# Patient Record
Sex: FEMALE | Race: OTHER | NOT HISPANIC OR LATINO
[De-identification: names, ages, dates, MRNs, and addresses within clinical notes are randomized per-mention and may not be internally consistent; named-entity substitution may affect disease eponyms.]

---

## 2022-08-10 ENCOUNTER — APPOINTMENT (OUTPATIENT)
Dept: ORTHOPEDIC SURGERY | Facility: CLINIC | Age: 22
End: 2022-08-10

## 2022-08-10 VITALS — WEIGHT: 106 LBS | HEIGHT: 60 IN | BODY MASS INDEX: 20.81 KG/M2

## 2022-08-10 DIAGNOSIS — M25.511 PAIN IN RIGHT SHOULDER: ICD-10-CM

## 2022-08-10 DIAGNOSIS — S43.431A SUPERIOR GLENOID LABRUM LESION OF RIGHT SHOULDER, INITIAL ENCOUNTER: ICD-10-CM

## 2022-08-10 DIAGNOSIS — M54.2 CERVICALGIA: ICD-10-CM

## 2022-08-10 PROCEDURE — 73030 X-RAY EXAM OF SHOULDER: CPT | Mod: RT

## 2022-08-10 PROCEDURE — 72040 X-RAY EXAM NECK SPINE 2-3 VW: CPT

## 2022-08-10 PROCEDURE — 99204 OFFICE O/P NEW MOD 45 MIN: CPT

## 2022-08-10 RX ORDER — MELOXICAM 15 MG/1
15 TABLET ORAL
Qty: 30 | Refills: 1 | Status: ACTIVE | COMMUNITY
Start: 2022-08-10 | End: 1900-01-01

## 2022-08-10 RX ORDER — CYCLOBENZAPRINE HYDROCHLORIDE 10 MG/1
10 TABLET, FILM COATED ORAL
Qty: 30 | Refills: 1 | Status: ACTIVE | COMMUNITY
Start: 2022-08-10 | End: 1900-01-01

## 2022-08-14 PROBLEM — M25.511 ACUTE PAIN OF RIGHT SHOULDER: Status: ACTIVE | Noted: 2022-08-10

## 2022-08-14 PROBLEM — S43.431A SUPERIOR GLENOID LABRUM LESION OF RIGHT SHOULDER, INITIAL ENCOUNTER: Status: ACTIVE | Noted: 2022-08-14

## 2022-08-14 NOTE — DISCUSSION/SUMMARY
[Medication Risks Reviewed] : Medication risks reviewed [Surgical risks reviewed] : Surgical risks reviewed [de-identified] : signs and symptoms of rotator cuff tear, discussed risks of potential rotator cuff  surgery and risks of operative  and non operative treatment of of tendonitis and impingement, will obtain mri to see if surgery is necessary and guide future treatment, in interim discussed use of nsaids and side effects and recommended rehab and discussed risks and benefits of injection\par prescribed nsaids and discussed risks of side effects and timing and management of medication.  side effects can include gi ulcers and irritation as welll as kidney failure and bleeding issues\par \par \par appears all the pain could be from trapezius spasms she possibly has an underlying shoulder issue but the majoiry is in the neck. recommend therapy , nsadis, and muscle relaxers. \par recommend mri to rule out rtc tear and further guide treatment, follow up immediately after mri of RIGHT SHOULDER \par later on will get MRI of the cervical spine. \par had been doing therapy for last 3 months at gym with  with no improvement

## 2022-08-14 NOTE — HISTORY OF PRESENT ILLNESS
[de-identified] : patient is an avid runner and she claims it bothers her often when shes running and she hears clicking often and it is now painful without activity. patient notes pain began about 4 months ago but didn’t have a specific injury. patient also notes some pain in in thoracic spine and her neck. patient notes she even feels pain walking but the worst is during running. she notes cramping as well.

## 2022-08-14 NOTE — PHYSICAL EXAM
[NL (45)] : right lateral flexion 45 degrees [NL (80)] : right lateral rotation 80 degrees [5___] : right grasp 5[unfilled]/5 [Biceps 2+] : biceps 2+ [Triceps 2+] : triceps 2+ [Brachioradialis 2+] : brachioradialis 2+ [Straightening consistent with spasm] : Straightening consistent with spasm [NL (0-180)] : full active abduction 0-180 degrees [NL (0-90)] : full external rotation 0-90 degrees [NL (0-70)] : full internal rotation 0-70 degrees [] : motor and sensory intact distally [Right] : right shoulder [There are no fractures, subluxations or dislocations. No significant abnormalities are seen] : There are no fractures, subluxations or dislocations. No significant abnormalities are seen

## 2022-09-14 ENCOUNTER — APPOINTMENT (OUTPATIENT)
Dept: PEDIATRIC CARDIOLOGY | Facility: CLINIC | Age: 22
End: 2022-09-14

## 2022-09-14 VITALS
BODY MASS INDEX: 20.73 KG/M2 | HEART RATE: 60 BPM | HEIGHT: 60.83 IN | WEIGHT: 109.79 LBS | SYSTOLIC BLOOD PRESSURE: 124 MMHG | OXYGEN SATURATION: 99 % | DIASTOLIC BLOOD PRESSURE: 72 MMHG

## 2022-09-14 DIAGNOSIS — Q21.1 ATRIAL SEPTAL DEFECT: ICD-10-CM

## 2022-09-14 DIAGNOSIS — Q44.7 OTHER CONGENITAL MALFORMATIONS OF LIVER: ICD-10-CM

## 2022-09-14 DIAGNOSIS — Q25.6 STENOSIS OF PULMONARY ARTERY: ICD-10-CM

## 2022-09-14 PROCEDURE — 99205 OFFICE O/P NEW HI 60 MIN: CPT | Mod: 25

## 2022-09-14 PROCEDURE — 93325 DOPPLER ECHO COLOR FLOW MAPG: CPT

## 2022-09-14 PROCEDURE — 93320 DOPPLER ECHO COMPLETE: CPT

## 2022-09-14 PROCEDURE — 93000 ELECTROCARDIOGRAM COMPLETE: CPT

## 2022-09-14 PROCEDURE — 93303 ECHO TRANSTHORACIC: CPT

## 2022-09-14 RX ORDER — NORETHINDRONE ACETATE AND ETHINYL ESTRADIOL 1.5; 3 MG/1; UG/1
1.5-3 TABLET ORAL
Refills: 0 | Status: ACTIVE | COMMUNITY

## 2022-09-14 RX ORDER — FLUOXETINE HYDROCHLORIDE 40 MG/1
CAPSULE ORAL
Refills: 0 | Status: ACTIVE | COMMUNITY

## 2022-09-14 RX ORDER — DEXMETHYLPHENIDATE HYDROCHLORIDE 10 MG/1
TABLET ORAL
Refills: 0 | Status: ACTIVE | COMMUNITY

## 2022-09-15 PROBLEM — Q21.1 PFO (PATENT FORAMEN OVALE): Status: ACTIVE | Noted: 2022-09-13

## 2022-09-15 PROBLEM — Q44.7 ALAGILLE SYNDROME: Status: ACTIVE | Noted: 2022-09-13

## 2022-09-15 PROBLEM — Q25.6 CONGENITAL STENOSIS OF LEFT PULMONARY ARTERY: Status: ACTIVE | Noted: 2022-09-13

## 2022-09-21 ENCOUNTER — APPOINTMENT (OUTPATIENT)
Dept: ORTHOPEDIC SURGERY | Facility: CLINIC | Age: 22
End: 2022-09-21

## 2022-09-29 NOTE — HISTORY OF PRESENT ILLNESS
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 She remains active without any exercise limitations or symptoms but has recently been evaluated for shoulder pain. Annual liver blood tests are reportedly "good".  Talisha is treated with Dexmethylphenidate and Fluoxetine.

## 2022-09-29 NOTE — CARDIOLOGY SUMMARY
[Today's Date] : [unfilled] [FreeTextEntry1] : EKG showed normal sinus arrhythmia (60/min), normal intervals (, QRS 74, QTc 386 ms) and axes, and normal precordial pattern. Prominent baseline artifact. See scanned tracing. [FreeTextEntry2] : Summary: (see scanned full report)\par 1.  Alagille syndrome with history of mild-moderate, proximal, left pulmonary artery stenosis and \par left-to- right shunt via a trivially patent foramen ovale.\par 2.  Qualitatively, color mapping is consistent with previously documented mild-to-moderate, \par proximal left pulmonary artery stenosis.\par - Measurement of the proximal LPA diameter may be inaccurate.\par - The right pulmonary artery appears mildly small but measured diameter is still within normal \par limits.\par 3.  Previously documented, trivial left-to-right flow jet at foramen ovale level is still present, \par based on technically limited subcostal and parasternal interrogation.\par 4.  Normal right ventricular morphology with qualitatively normal size and systolic function.\par 5. Normal left ventricular size, morphology and systolic function.\par 6. No evidence of pulmonary hypertension.\par 7.  Pulmonary artery pressure estimate is based on interventricular septal systolic configuration.\par 8.  Technically limited examination, especially of pulmonary arteries, pulmonary veins and coronary\par arteries, secondary to poor acoustic windows.\par \par M-mode                       Z-score (where applicable)\par IVSd:                 0.60cm    -2.06*\par IVSs:                 1.29cm    0.61\par LVIDd:                4.60cm    -0.05\par LVIDs:                2.65cm    -1.06\par LVPWd:               0.63cm    -1.66\par LVPWs:               1.49cm    0.84\par \par 2-Dimensional                       Z-score (where applicable)\par LV volume, d (AL)            130mL     1.24\par LV volume, s (AL)            47mL\par LV mass (SAX AL):       87g      -0.71\par LV mass index:            27.12g/ht^2.7\par LA volume:               33.5mL     0.23\par LA volume index:           23.0mL/m2\par Ao annulus:              1.79cm     -0.59\par Ao root sinus, s:            2.49cm     -0.15\par Ao ST junct, s:             2.06cm     -0.08\par Ao arch (transv):       1.69cm     -0.50\par Ao isthmus, s:             1.23cm     -1.18\par RPA, s:                 1.08cm     -1.41\par LPA, s:                 0.67cm     -3.09*\par MV annulus, d (4Ch):         2.59cm     0.05\par TV annulus, d (4Ch):          2.42cm     -0.87\par TAPSE:                2.07cm\par \par Systolic Function                   Z-score (where applicable)\par LV SF (M-mode):           42%\par LV EF (5/6 AL)            64%     0.15\par \par LV Diastolic Function\par Lateral annulus e':         0.16m/s\par E/e' (mitral lateral):         4.63\par Septal annulus e':         0.09m/s\par E/e' (mitral septal):         7.98\par \par Mitral Valve Doppler\par Peak E:               0.76m/s\par \par Pulmonary Arteries Doppler\par LPA peak velocity:         2.33m/s\par LPA peak gradient:        21.80mmHg

## 2022-09-29 NOTE — REASON FOR VISIT
[Initial Consultation] : an initial consultation for [Patient] : patient [Mother] : mother [FreeTextEntry3] : left pulmonary artery stenosis

## 2022-09-29 NOTE — DISCUSSION/SUMMARY
[FreeTextEntry1] : ASSESSMENT:\par 1)	Alagille syndrome with normal liver function and negative vascular malformation screening with brain MRA in 2016.\par 2)	Mild-to-moderate, left, peripheral pulmonary stenosis – stable.\par 3)	Trivially "incompetent" foramen ovale with physiologically insignificant left-to-right flow.\par 4)	S/p spontaneous, (late) closure of a small ductus arteriosus.\par \par DISCUSSION:\par Talisha has mild-to-moderate left peripheral pulmonic stenosis as well as a trivial left to right shunt via a trivially incompetent foramen ovale type of secundum atrial septal defect. The atrial shunt is physiologically insignificant. The peripheral pulmonic stenosis has been relatively stable despite somatic growth. Talisha appears to have near-normal right ventricular pressure (based on end-systolic septal position) with normal systolic function. Pulmonary flow distribution at the last MRI (16) was only mildly asymmetric with QpR/QpL of 56%/44% by branch pulmonary artery flow and 61%/39% by pulmonary vein flow (compared to 62%/38% in 2012). Given the stability of peripheral pulmonic stenosis and excellent right ventricular function until now, we have pursued conservative follow up rather than intervening with balloon/stent pulmonary angioplasty. Nonetheless, we annually reassess the degree of obstruction to ascertain whether the degree of obstruction merits intervention.  More complete reevaluation of the degree of peripheral pulmonary stenosis requires cardiac MRI.  The cardiac MRI results inform the decision whether to consider intervening with balloon pulmonary angioplasty +/- stenting.\par \par Because Alagille syndrome can be associated with progressive liver involvement, Talisha should continue to have serial reevaluation of liver enzymes and pancreatic function as part of her routine annual checkups.\par \par We discussed the genetic implications of Alagille syndrome diagnosis. Our knowledge of genetics is evolving rapidly and a formal consultation with the cardiovascular genetics program may be beneficial in understanding the implications better. 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  Although it can be mild (as in Talisha and her sister's cases) but it can also be associated with severe congenital heart disease, usually tetralogy of Fallot or other defects associated with pulmonary arterial underdevelopment. Other vascular anomalies, such as brain AV malformations, renal artery stenosis and/or midaortic syndrome have been reported as well as a variety of other arterial involvements.\par \par Patients need to be monitored for these complications throughout life. With respect to pregnancy, because the syndrome is autosomal dominant, women with Alagille syndrome can pursue in vitro fertilization with pre-implantation cytogenetic diagnosis so that embryos without affected gene can be implanted. All these therapeutic options are continually evolving and, as above, can be explored with formal genetic counseling.\par \par Alagille syndrome is occasionally associated with other vascular abnormalities for which we screened Talisha's brain at a previous MRI (16), uncovering no vascular abnormalities.\par \par Talisha’s congenital heart defect might pose a minimally increased- albeit, still rare -risk of infective endocarditis. The 2007 American Heart Association guidelines no longer recommend antibiotic dental and other "dirty" procedure prophylaxis. Nonetheless, we reinforce good dental hygiene and preventive dental checkups. We recommend prompt evaluation of persistent (>~2 days) and otherwise unexplained (even “low grade”) fever or persistent (> 1 week) and unexplained malaise even without fever with blood cultures and other blood work to screen for infective endocarditis, since its early identification and treatment can prevent or mitigate cardiac and non-cardiac complications. Presumptive oral antibiotic treatment of fever without source should be avoided in favor of cultures and other investigations (oral antibiotics will not prevent or eradicate infective endocarditis); however, fever with an identified source (eg, otitis media) should be treated in the usual fashion for that type of infection.\par \par PLAN:\par 1)	Transfer care to adult congenital heart disease program. (Lives in [facility] - will follow w/ ACHD program at [place]).\par 2)	Annual f/u.  Due for repeat cardiac MRI wherever she pursues f/u care in 1 year. Screening for other vascular malformations should be considered in long term follow up.\par 3)	Genetic counseling for issues related to reproduction and recurrence risks.\par \par ADDENDUM:\par PMH: Normal pregnancy, delivery, and  period. After going home, FTT\par Failure to thrive in infant, resolved at ~ 6 mos. of age\par LFT's Abnormal: history of mildly abnormal LFTs that improved spontaneously.\par PDA (patent ductus arteriosus)  10/12/2001: closed spontaneously between 1 and 2 years of age.\par No past surgical history on file.\par \par FH: Sister with Alagille, PPS, PFO, PDA \par \par Patient Problem List\par Alagille syndrome - Talisha had mildly abnormal LFTs in the past. Brain MRA 16 was normal.\par PPS (peripheral pulmonic stenosis)\par Moderate PPS with mild gradient across the LPA and the right lower lobe. Nuclear pulmonary perfusion scan in  showed 37% of flow to the left lung. MRI 2012 showed: "...moderate discrete stenosis at the origin of the left pulmonary artery - measures 6.4 x 5.8mm (3D-SSFP) and 7.7 mm (MRA). The distal LPA is relatively normal sized with good arborization. The RPA is mildly diffusely small - measures 10.0 x 11.0 mm (MRA). Qualitatively there is slightly reduce flow to the left lung field on perfusion imaging.  Quantitative flow shows - Qp-right = 62%; Qp-left = 38%. This compares to nuclear perfusion scan of 37% to the left lung in ." \par \par Excerpts from MRI 2016:\par Unobstructed right ventricular outflow tract and main pulmonary artery. Proximal main pulmonary artery measures 1.6 x 1.5 cm. The left pulmonary artery has an area of discrete stenosis (6.0 x 5.5mm), not significantly changed from prior examination (6.4 x 5.8mm in ). Both measurements from 3D-steady state free precession. The distal left pulmonary artery is relatively normal sized (11 x 8.0mm). The right pulmonary artery is mildly diffusely small and unchanged in size (measuring 11 x 10mm on this examination and in 2012). Both measurements by MRA.\par Quantitative lung flow shows QpR/QpL of 56%/44% by branch pulmonary artery flow and 61%/39% by pulmonary vein flow (compared to 62%/38% in 2012). Unchanged.\par Qualitatively normal biventricular function. No ventricular hypertrophy. Septal position on cine imaging suggests less than 1/2 systemic right ventricular systolic pressure.\par Left aortic arch with normal branching pattern. Coarctation excluded. Prominent ductal ampulla on the aortic side, no flow seen by echocardiography. Normal venous connections. Normal proximal coronary origins (by prior).\par Results limited to above.\par MEASUREMENTS:\par Anatomic Data	A-P	R-L/S-I  Area\par Left Upper Pulmonary Vein (l/min):	2.30	1.62 \par Right Upper Pulmonary Vein (l/min):	3.50	2.46\par Trivially patent foramen ovale (PFO) [Needs SBE Prophylaxis] : [unfilled] does not need bacterial endocarditis prophylaxis

## 2022-09-29 NOTE — PHYSICAL EXAM
[General Appearance - Alert] : alert [General Appearance - In No Acute Distress] : in no acute distress [General Appearance - Well Nourished] : well nourished [General Appearance - Well Developed] : well developed [General Appearance - Well-Appearing] : well appearing [Appearance Of Head] : the head was normocephalic [Sclera] : the conjunctiva were normal [Outer Ear] : the ears and nose were normal in appearance [Examination Of The Oral Cavity] : mucous membranes were moist and pink [Auscultation Breath Sounds / Voice Sounds] : breath sounds clear to auscultation bilaterally [Normal Chest Appearance] : the chest was normal in appearance [Apical Impulse] : quiet precordium with normal apical impulse [Heart Rate And Rhythm] : normal heart rate and rhythm [Heart Sounds] : normal S1 and S2 [Heart Sounds Gallop] : no gallops [Heart Sounds Pericardial Friction Rub] : no pericardial rub [Heart Sounds Click] : no clicks [Arterial Pulses] : normal upper and lower extremity pulses with no pulse delay [Edema] : no edema [Capillary Refill Test] : normal capillary refill [Systolic] : systolic [II] : a grade 2/6 [LUSB] : LUSB [Ejection] : ejection [Med] : medium pitched [Harsh] : harsh [Mid] : mid [Bowel Sounds] : normal bowel sounds [Abdomen Soft] : soft [Nondistended] : nondistended [Abdomen Tenderness] : non-tender [Motor Tone] : muscle strength and tone were normal [Nail Clubbing] : no clubbing  or cyanosis of the fingers [Abnormal Walk] : normal gait [Cervical Lymph Nodes Enlarged Anterior] : The anterior cervical nodes were normal [Cervical Lymph Nodes Enlarged Posterior] : The posterior cervical nodes were normal [] : no rash [Skin Turgor] : normal turgor [Demonstrated Behavior - Infant Nonreactive To Parents] : interactive [Mood] : mood and affect were appropriate for age [Demonstrated Behavior] : normal behavior [FreeTextEntry1] : mildly depressed sternum

## 2022-09-29 NOTE — CONSULT LETTER
[Today's Date] : [unfilled] [Name] : Name: [unfilled] [] : : ~~ [Today's Date:] : [unfilled] [Dear  ___:] : Dear Dr. [unfilled]: [Consult] : I had the pleasure of evaluating your patient, [unfilled]. My full evaluation follows. [Consult - Single Provider] : Thank you very much for allowing me to participate in the care of this patient. If you have any questions, please do not hesitate to contact me. [Sincerely,] : Sincerely, [DrAram  ___] : Dr. GRECO [FreeTextEntry4] : Jose D Casanova MD [FreeTextEntry5] : [address] [FreeTextEntry6] : [place] [address] [FreeTextEntry7] : Via Fax: [phone] [de-identified] : Here is a brief, "executive summary" of the consultation: NATO TYSON has stable, mild-to-moderate, proximal left pulmonary artery stenosis and a trivially patent foramen ovale in association with Alagille syndrome.  Her last MRI was in 2016 and showed only mild maldistribution of flow between the right and left branch pulmonary arteries.  I reviewed Esteban's cardiac situation in detail with her and her mother.  At this point, I suggested that she transfer her care to an adult congenital heart disease program and, if she can tolerate it, undergo a repeat cardiac MRI (alternatively, she could have a CT angio for imaging but would need a separate nuclear flow study for relative flow to each lung).  Since she lives in [facility] and has been followed previously for many years at [facility], she will pursue her follow-up with Dr. Jeffy Hermosillo at [facility]. [de-identified] : Jodi\par \par Jodi Cronin MD\par Director of Pediatric Echocardiography\par [facility]\par Professor of Pediatrics\par [facility] [employer] [facility]\par [address], [address]\[place] [address]\par [phone]

## 2022-09-29 NOTE — REVIEW OF SYSTEMS
[Joint Pains] : arthralgias [Anxiety] : anxiety [Feeling Poorly] : not feeling poorly (malaise) [Fever] : no fever [Wgt Loss (___ Lbs)] : no recent weight loss [Pallor] : not pale [Eye Discharge] : no eye discharge [Nasal Stuffiness] : no nasal congestion [Sore Throat] : no sore throat [Cyanosis] : no cyanosis [Edema] : no edema [Diaphoresis] : not diaphoretic [Chest Pain] : no chest pain or discomfort [Exercise Intolerance] : no persistence of exercise intolerance [Palpitations] : no palpitations [Orthopnea] : no orthopnea [Fast HR] : no tachycardia [Tachypnea] : not tachypneic [Wheezing] : no wheezing [Cough] : no cough [Shortness Of Breath] : not expressed as feeling short of breath [Vomiting] : no vomiting [Diarrhea] : no diarrhea [Abdominal Pain] : no abdominal pain [Decrease In Appetite] : appetite not decreased [Fainting (Syncope)] : no fainting [Seizure] : no seizures [Dizziness] : no dizziness [Limping] : no limping [Rash] : no rash [Easy Bruising] : no tendency for easy bruising [Sleep Disturbances] : ~T no sleep disturbances [Hyperactive] : no hyperactive behavior [Depression] : no depression [Short Stature] : short stature was not noted [Dec Urine Output] : no oliguria

## 2022-10-24 ENCOUNTER — RESULT REVIEW (OUTPATIENT)
Age: 22
End: 2022-10-24

## 2024-11-25 ENCOUNTER — EMERGENCY (EMERGENCY)
Facility: HOSPITAL | Age: 24
LOS: 1 days | Discharge: HOME | End: 2024-11-25
Attending: EMERGENCY MEDICINE | Admitting: EMERGENCY MEDICINE
Payer: COMMERCIAL

## 2024-11-25 VITALS
HEART RATE: 63 BPM | OXYGEN SATURATION: 99 % | TEMPERATURE: 98 F | SYSTOLIC BLOOD PRESSURE: 116 MMHG | DIASTOLIC BLOOD PRESSURE: 75 MMHG | RESPIRATION RATE: 18 BRPM

## 2024-11-25 VITALS
WEIGHT: 115.96 LBS | OXYGEN SATURATION: 100 % | HEART RATE: 103 BPM | TEMPERATURE: 98 F | HEIGHT: 63 IN | DIASTOLIC BLOOD PRESSURE: 54 MMHG | RESPIRATION RATE: 18 BRPM | SYSTOLIC BLOOD PRESSURE: 120 MMHG

## 2024-11-25 DIAGNOSIS — N83.519 TORSION OF OVARY AND OVARIAN PEDICLE, UNSPECIFIED SIDE: Chronic | ICD-10-CM

## 2024-11-25 DIAGNOSIS — Z90.6 ACQUIRED ABSENCE OF OTHER PARTS OF URINARY TRACT: ICD-10-CM

## 2024-11-25 DIAGNOSIS — N83.202 UNSPECIFIED OVARIAN CYST, LEFT SIDE: ICD-10-CM

## 2024-11-25 DIAGNOSIS — R11.0 NAUSEA: ICD-10-CM

## 2024-11-25 DIAGNOSIS — Z88.0 ALLERGY STATUS TO PENICILLIN: ICD-10-CM

## 2024-11-25 DIAGNOSIS — R10.32 LEFT LOWER QUADRANT PAIN: ICD-10-CM

## 2024-11-25 LAB
ALBUMIN SERPL ELPH-MCNC: 4.2 G/DL — SIGNIFICANT CHANGE UP (ref 3.3–5)
ALP SERPL-CCNC: 216 U/L — HIGH (ref 40–120)
ALT FLD-CCNC: 31 U/L — SIGNIFICANT CHANGE UP (ref 10–45)
ANION GAP SERPL CALC-SCNC: 9 MMOL/L — SIGNIFICANT CHANGE UP (ref 5–17)
APPEARANCE UR: CLEAR — SIGNIFICANT CHANGE UP
AST SERPL-CCNC: 30 U/L — SIGNIFICANT CHANGE UP (ref 10–40)
BASOPHILS # BLD AUTO: 0.04 K/UL — SIGNIFICANT CHANGE UP (ref 0–0.2)
BASOPHILS NFR BLD AUTO: 0.5 % — SIGNIFICANT CHANGE UP (ref 0–2)
BILIRUB SERPL-MCNC: 0.3 MG/DL — SIGNIFICANT CHANGE UP (ref 0.2–1.2)
BILIRUB UR-MCNC: NEGATIVE — SIGNIFICANT CHANGE UP
BUN SERPL-MCNC: 18 MG/DL — SIGNIFICANT CHANGE UP (ref 7–23)
CALCIUM SERPL-MCNC: 8.8 MG/DL — SIGNIFICANT CHANGE UP (ref 8.4–10.5)
CHLORIDE SERPL-SCNC: 102 MMOL/L — SIGNIFICANT CHANGE UP (ref 96–108)
CO2 SERPL-SCNC: 24 MMOL/L — SIGNIFICANT CHANGE UP (ref 22–31)
COLOR SPEC: YELLOW — SIGNIFICANT CHANGE UP
CREAT SERPL-MCNC: 0.93 MG/DL — SIGNIFICANT CHANGE UP (ref 0.5–1.3)
DIFF PNL FLD: NEGATIVE — SIGNIFICANT CHANGE UP
EGFR: 87 ML/MIN/1.73M2 — SIGNIFICANT CHANGE UP
EOSINOPHIL # BLD AUTO: 0.02 K/UL — SIGNIFICANT CHANGE UP (ref 0–0.5)
EOSINOPHIL NFR BLD AUTO: 0.3 % — SIGNIFICANT CHANGE UP (ref 0–6)
GLUCOSE SERPL-MCNC: 95 MG/DL — SIGNIFICANT CHANGE UP (ref 70–99)
GLUCOSE UR QL: NEGATIVE MG/DL — SIGNIFICANT CHANGE UP
HCG SERPL-ACNC: <1 MIU/ML — SIGNIFICANT CHANGE UP
HCT VFR BLD CALC: 38.3 % — SIGNIFICANT CHANGE UP (ref 34.5–45)
HGB BLD-MCNC: 12.8 G/DL — SIGNIFICANT CHANGE UP (ref 11.5–15.5)
IMM GRANULOCYTES NFR BLD AUTO: 0.3 % — SIGNIFICANT CHANGE UP (ref 0–0.9)
KETONES UR-MCNC: NEGATIVE MG/DL — SIGNIFICANT CHANGE UP
LEUKOCYTE ESTERASE UR-ACNC: NEGATIVE — SIGNIFICANT CHANGE UP
LIDOCAIN IGE QN: 34 U/L — SIGNIFICANT CHANGE UP (ref 7–60)
LYMPHOCYTES # BLD AUTO: 3.5 K/UL — HIGH (ref 1–3.3)
LYMPHOCYTES # BLD AUTO: 44 % — SIGNIFICANT CHANGE UP (ref 13–44)
MCHC RBC-ENTMCNC: 31.4 PG — SIGNIFICANT CHANGE UP (ref 27–34)
MCHC RBC-ENTMCNC: 33.4 G/DL — SIGNIFICANT CHANGE UP (ref 32–36)
MCV RBC AUTO: 93.9 FL — SIGNIFICANT CHANGE UP (ref 80–100)
MONOCYTES # BLD AUTO: 0.35 K/UL — SIGNIFICANT CHANGE UP (ref 0–0.9)
MONOCYTES NFR BLD AUTO: 4.4 % — SIGNIFICANT CHANGE UP (ref 2–14)
NEUTROPHILS # BLD AUTO: 4.02 K/UL — SIGNIFICANT CHANGE UP (ref 1.8–7.4)
NEUTROPHILS NFR BLD AUTO: 50.5 % — SIGNIFICANT CHANGE UP (ref 43–77)
NITRITE UR-MCNC: NEGATIVE — SIGNIFICANT CHANGE UP
NRBC # BLD: 0 /100 WBCS — SIGNIFICANT CHANGE UP (ref 0–0)
PH UR: 6 — SIGNIFICANT CHANGE UP (ref 5–8)
PLATELET # BLD AUTO: 257 K/UL — SIGNIFICANT CHANGE UP (ref 150–400)
POTASSIUM SERPL-MCNC: 4.3 MMOL/L — SIGNIFICANT CHANGE UP (ref 3.5–5.3)
POTASSIUM SERPL-SCNC: 4.3 MMOL/L — SIGNIFICANT CHANGE UP (ref 3.5–5.3)
PROT SERPL-MCNC: 7.1 G/DL — SIGNIFICANT CHANGE UP (ref 6–8.3)
PROT UR-MCNC: NEGATIVE MG/DL — SIGNIFICANT CHANGE UP
RBC # BLD: 4.08 M/UL — SIGNIFICANT CHANGE UP (ref 3.8–5.2)
RBC # FLD: 12 % — SIGNIFICANT CHANGE UP (ref 10.3–14.5)
SODIUM SERPL-SCNC: 135 MMOL/L — SIGNIFICANT CHANGE UP (ref 135–145)
SP GR SPEC: 1.01 — SIGNIFICANT CHANGE UP (ref 1–1.03)
UROBILINOGEN FLD QL: 0.2 MG/DL — SIGNIFICANT CHANGE UP (ref 0.2–1)
WBC # BLD: 7.95 K/UL — SIGNIFICANT CHANGE UP (ref 3.8–10.5)
WBC # FLD AUTO: 7.95 K/UL — SIGNIFICANT CHANGE UP (ref 3.8–10.5)

## 2024-11-25 PROCEDURE — 76856 US EXAM PELVIC COMPLETE: CPT

## 2024-11-25 PROCEDURE — 83690 ASSAY OF LIPASE: CPT

## 2024-11-25 PROCEDURE — 76856 US EXAM PELVIC COMPLETE: CPT | Mod: 26

## 2024-11-25 PROCEDURE — 80053 COMPREHEN METABOLIC PANEL: CPT

## 2024-11-25 PROCEDURE — 99284 EMERGENCY DEPT VISIT MOD MDM: CPT | Mod: 25

## 2024-11-25 PROCEDURE — 99283 EMERGENCY DEPT VISIT LOW MDM: CPT

## 2024-11-25 PROCEDURE — 85025 COMPLETE CBC W/AUTO DIFF WBC: CPT

## 2024-11-25 PROCEDURE — 36415 COLL VENOUS BLD VENIPUNCTURE: CPT

## 2024-11-25 PROCEDURE — 81003 URINALYSIS AUTO W/O SCOPE: CPT

## 2024-11-25 PROCEDURE — 84702 CHORIONIC GONADOTROPIN TEST: CPT

## 2024-11-25 PROCEDURE — 76830 TRANSVAGINAL US NON-OB: CPT

## 2024-11-25 PROCEDURE — 99285 EMERGENCY DEPT VISIT HI MDM: CPT

## 2024-11-25 PROCEDURE — 76830 TRANSVAGINAL US NON-OB: CPT | Mod: 26

## 2024-11-25 PROCEDURE — 96374 THER/PROPH/DIAG INJ IV PUSH: CPT

## 2024-11-25 RX ORDER — KETOROLAC TROMETHAMINE 30 MG/ML
15 INJECTION INTRAMUSCULAR; INTRAVENOUS ONCE
Refills: 0 | Status: DISCONTINUED | OUTPATIENT
Start: 2024-11-25 | End: 2024-11-25

## 2024-11-25 RX ORDER — SODIUM CHLORIDE 9 MG/ML
1000 INJECTION, SOLUTION INTRAMUSCULAR; INTRAVENOUS; SUBCUTANEOUS ONCE
Refills: 0 | Status: COMPLETED | OUTPATIENT
Start: 2024-11-25 | End: 2024-11-25

## 2024-11-25 RX ADMIN — KETOROLAC TROMETHAMINE 15 MILLIGRAM(S): 30 INJECTION INTRAMUSCULAR; INTRAVENOUS at 12:13

## 2024-11-25 RX ADMIN — SODIUM CHLORIDE 1000 MILLILITER(S): 9 INJECTION, SOLUTION INTRAMUSCULAR; INTRAVENOUS; SUBCUTANEOUS at 12:13

## 2024-11-25 NOTE — ED ADULT NURSE NOTE - NSFALLHARMRISKINTERV_ED_ALL_ED

## 2024-11-25 NOTE — CONSULT NOTE ADULT - SUBJECTIVE AND OBJECTIVE BOX
Patient is a 24 year old G0 LMP  presenting from her OB/GYN's office w/ LLQ pain x 3 days intermittently. Pt reports she has noted sharp LLQ pain since her period ended last week. Pt went to see her OB/GYN today to rule out torsion given her hx of torsion at age 15.   Pt reports that the pain is a 6/10 when it happens and it comes in waves. Pt has not taken any thing for pain. Pt reports no sexual activity in 3 months. Pt taking Vestura for birth control.      Pt denies fever, chills, chest pain, SOB, nausea, vomiting, vaginal bleeding      OB Hx:   GYN Hx: Hx L ovarian torsion age 15 (s/p detorsion). Denies fibroids, cysts, STIs, abnormal pap smears  Last PAP smear: WNL (repeat pap done today at Dr. Lewis office)    PMHx: Branch pulmonary stenosis  SHx: L/S ovarian de torsion age 15  Meds: Fluoxetine 10mg qd, wellbutrin 100 mg qd.   Allergies: Amoxicillin (hives)    Social hx: Works in [place]. Lives with her sister.     PHYSICAL EXAM:   Vital Signs Last 24 Hrs  T(C): 36.8 (2024 14:34), Max: 36.9 (2024 11:14)  T(F): 98.3 (2024 14:34), Max: 98.4 (2024 11:14)  HR: 63 (2024 14:34) (63 - 103)  BP: 116/75 (2024 14:34) (116/75 - 120/54)  BP(mean): --  RR: 18 (2024 14:34) (18 - 18)  SpO2: 99% (2024 14:34) (99% - 100%)    Parameters below as of 2024 14:34  Patient On (Oxygen Delivery Method): room air        **************************  Constitutional: Alert & Oriented x3, No acute distress  Respiratory: Clear to ausculation bilaterally; no wheezing, rhonchi, or crackles  Cardiovascular: regular rate and rhythm, no murmurs, or gallops  Gastrointestinal: soft, non tender, positive bowel sounds, no rebound or guarding   SSE: Normal external genitalia. No blood noted in vaginal vault. Os visually closed. No cervical lesions noted. No active bleeding on valsalva  SVE: No CMT w/ bimanual exam. No adnexal masses palpated.   Extremities: no calf tenderness or swelling    LABS:                        12.8   7.95  )-----------( 257      ( 2024 11:25 )             38.3         135  |  102  |  18  ----------------------------<  95  4.3   |  24  |  0.93    Ca    8.8      2024 11:25    TPro  7.1  /  Alb  4.2  /  TBili  0.3  /  DBili  x   /  AST  30  /  ALT  31  /  AlkPhos  216[H]        Urinalysis Basic - ( 2024 11:25 )    Color: Yellow / Appearance: Clear / S.010 / pH: x  Gluc: 95 mg/dL / Ketone: Negative mg/dL  / Bili: Negative / Urobili: 0.2 mg/dL   Blood: x / Protein: Negative mg/dL / Nitrite: Negative   Leuk Esterase: Negative / RBC: x / WBC x   Sq Epi: x / Non Sq Epi: x / Bacteria: x      HCG Quantitative, Serum: <1 mIU/mL ( @ 11:25)      RADIOLOGY & ADDITIONAL STUDIES:      ACC: [id number] EXAM:  US TRANSVAGINAL   ORDERED BY: YUNIEL GALVAN     ACC: [id number] EXAM:  US PELVIC COMPLETE   ORDERED BY: YUNIEL GALVAN     PROCEDURE DATE:  2024          INTERPRETATION:  CLINICAL INFORMATION: 24-year-old female. Left lower   quadrant pain.    LMP: 2024    COMPARISON: None available.    TECHNIQUE:  Endovaginal and transabdominal pelvic sonogram. Color and Spectral   Doppler was performed.    FINDINGS:  Uterus: 6.3 cm x 2.5 cm x 3.3 cm. Within normal limits.  Endometrium: 4 mm. Within normal limits.  Cervix: Unremarkable    Right ovary: 2.7 cm x 1.7 cm x 1.9 cm. ovarian volume 4.7 mL Within   normal limits. Normal arterial and venous waveforms.  Left ovary: 3.3 cm x 2.4 cm x 2.6 cm. Ovarian volume 10.9 mL 2.6 x 2.5 x   2.5 cm left corpus luteum with probable hemorrhage... Normal arterial and   venous waveforms.    Fluid: Small amount of pelvic free fluid in cul-de-sac.    IMPRESSION:  No evidence of ovarian torsion.  Probable 2.6 cm hemorrhagic left ovarian corpus luteum.    --- End of Report ---    RO REESE MD; Attending Radiologist  This document has been electronically signed. 2024  2:30PM

## 2024-11-25 NOTE — ED PROVIDER NOTE - NSFOLLOWUPINSTRUCTIONS_ED_ALL_ED_FT
Follow up with Dr. Lewis in 3 weeks   Return to ED for worsening pain, fever, chills, vomiting, vaginal bleeding or any other concerning symptoms    Ovarian Cyst  The female reproductive system, with a close-up of the ovaries and an ovarian cyst.  An ovarian cyst is a fluid-filled sac that forms on an ovary. The ovaries are small organs that produce eggs in females. Many types of cysts can form on the ovaries. Most of these cysts go away on their own and are not cancer. Some cysts need treatment.    What are the causes?  Ovarian cysts may be caused by:  Chemical or hormone changes in your body during your normal monthly period.  Polycystic ovarian syndrome (PCOS). This is a common problem of the hormones.  Endometriosis. The tissue that lines the inside of the uterus grows outside of the uterus. If it grows on your ovaries, it can form cysts.  Pregnancy. Your body makes more hormones than normal to support the pregnancy. This can form cysts.  Infection. Infection in your uterus can spread to your ovaries and can form cysts.  What increases the risk?  You are more likely to get this condition if:  You take medicines to help you get pregnant.  You have family members who have ovarian cysts.  What are the signs or symptoms?  Many ovarian cysts do not cause symptoms. If you have symptoms, they may include:  Pain or pressure around the pelvis. The pelvis is the area between the hip bones.  Pain in the lower belly.  Pain during sex.  Swelling in the lower belly.  Periods that don't come at the same time each month.  Pain during a period.  How is this diagnosed?  These cysts are found during a routine exam of the pelvis. You may have other tests to find out more about the cysts. Tests include ultrasound, CT scan, MRI, and blood tests.    How is this treated?  Many ovarian cysts go away on their own without treatment. When treatment is needed, it may include:  Medicines to help treat pain.  A procedure to drain the cyst.  Surgery to take out the cyst.  Hormones or birth control pills.  Surgery to take out the ovary.  Follow these instructions at home:  Take all medicines only as told by your health care provider.  If told, get Pap tests and regular exams of the pelvis.  Do not smoke, vape, or use nicotine or tobacco.  Return to your normal activities when your provider says that it's safe.  Keep all follow-up visits. Your provider may want to check your cyst for 2–3 months to see if it changes.  If you're in menopause, it's important to have your cyst checked closely because females in this age group have a higher rate of cancer of the ovaries.  Contact a health care provider if:  You have any new symptoms.  Your symptoms get worse.  Get help right away if:  You have really bad pain in the belly or pelvis, and the pain comes on all of a sudden.  You have heavy bleeding that soaks through more than one pad every hour.  This information is not intended to replace advice given to you by your health care provider. Make sure you discuss any questions you have with your health care provider.

## 2024-11-25 NOTE — ED PROVIDER NOTE - PHYSICAL EXAMINATION
CONSTITUTIONAL: Well-appearing;  in no apparent distress.   HEAD: Normocephalic; atraumatic.   EYES: PERRL; EOM intact; conjunctiva and sclera clear  ENT: normal nose; no rhinorrhea; normal pharynx with no erythema or lesions.   NECK: Supple; non-tender; no LAD  CARDIOVASCULAR: Normal S1, S2; No audible murmurs. Regular rate and rhythm.   RESPIRATORY: Breathing easily; breath sounds clear and equal bilaterally; no wheezes, rhonchi, or rales.  GI: Soft; non-distended; +L pelvic ttp, no rebound or guarding   MSK: FROM at all extremities, normal tone

## 2024-11-25 NOTE — CONSULT NOTE ADULT - ASSESSMENT
Patient is a 24 year old G0 LMP 11/20 hx ovarian torsion presenting from her OB/GYN's office w/ LLQ pain x 3 days intermittently. VSS. Hgb 12.8. Ultrasound shows no evidence of torsion. Left hemorrhagic corpus luteum noted. .   - Recommend discharge home w/ return precautions- Sharp abdominal/pelvic pain, dizziness, syncope, heavy vaginal bleeding.    - Recommend follow up with her GYN in 3-6 weeks.     Plan discussed w/ Dr. Le Stevenson PA-C

## 2024-11-25 NOTE — ED PROVIDER NOTE - CLINICAL SUMMARY MEDICAL DECISION MAKING FREE TEXT BOX
23 yo F with pmh of a L ovarian torsion at age 15 s/p cystectomy (kept ovary) c/o LLQ pain x 4 days. Pain is intermittent associated with nausea. Pain radiates into groin. Pt states when she had the torsion last time the pain was more constant and she is not sure if this is the same. Denies fever, chills, dysuria, hematuria, vaginal bleeding. LMP 5 days ago. Last took advil at 345 am. Pt saw her gyn today who saw a cyst on her L ovary and sent her to the ED to r/o torsion.+L pelvic ttp, no rebound or guarding 25 yo F with pmh of a L ovarian torsion at age 15 s/p cystectomy (kept ovary) c/o LLQ pain x 4 days. Pain is intermittent associated with nausea. Pain radiates into groin. Pt states when she had the torsion last time the pain was more constant and she is not sure if this is the same. Denies fever, chills, dysuria, hematuria, vaginal bleeding. LMP 5 days ago. Last took advil at 345 am. Pt saw her gyn today who saw a cyst on her L ovary and sent her to the ED to r/o torsion.+L pelvic ttp, no rebound or guarding. US shows No evidence of ovarian torsion. Probable 2.6 cm hemorrhagic left ovarian corpus luteum. seen by gyn, cleared for dc

## 2024-11-25 NOTE — ED ADULT NURSE NOTE - NSICDXPASTMEDICALHX_GEN_ALL_CORE_FT
PAST MEDICAL HISTORY:  Pulmonary stenosis Branch pulmonary stenosis    Torsion of right ovary and ovarian pedicle

## 2024-11-25 NOTE — ED PROVIDER NOTE - OBJECTIVE STATEMENT
25 yo F with pmh of a L ovarian torsion at age 15 s/p cystectomy (kept ovary) c/o LLQ pain x 4 days. Pain is intermittent associated with nausea. Pain radiates into groin. Pt states when she had the torsion last time the pain was more constant and she is not sure if this is the same. Denies fever, chills, dysuria, hematuria, vaginal bleeding. LMP 5 days ago. Last took advil at 345 am. Pt saw her gyn today who saw a cyst on her L ovary and sent her to the ED to r/o torsion.

## 2024-11-25 NOTE — ED ADULT NURSE NOTE - OBJECTIVE STATEMENT
pt sent to ED by OBGYN to r/o ovarian torsion. pt reports " LLQ pain since Friday, US done showing a Cyst, concerned due to psxh of right ovarian torsion"

## 2024-11-25 NOTE — ED ADULT TRIAGE NOTE - CHIEF COMPLAINT QUOTE
c/o LLQ pain since Friday. saw OBGYN who saw a cyst on US but sent her here to r/o torsion d/t h/o right ovarian torsion in the past requiring surgery.

## 2024-11-25 NOTE — ED PROVIDER NOTE - CARE PROVIDER_API CALL
Ara Lewis  Obstetrics and Gynecology  [address], [address] [place] [address]  Phone: [phone]  Fax: [phone]  Follow Up Time:

## 2024-11-25 NOTE — ED PROVIDER NOTE - ATTENDING APP SHARED VISIT CONTRIBUTION OF CARE
23 yo F with PMH of  left ovarian torsion at age 15 s/p cystectomy c/o LLQ pain x 4 days. Pain is intermittent associated with nausea. Denies fever, chills, dysuria, hematuria, vaginal bleeding. LMP 5 days ago. Pt saw her gyn today who saw a cyst on her L ovary and sent her to the ED to r/o torsion. VS noyed. AAO, NAD, mild lower left abd TTP. US shows no evidence of ovarian torsion. Probable 2.6 cm hemorrhagic left ovarian corpus luteum. Pt seen by gyn in ED, Cleared for discharge. Non-toxic appearing and stable for discharge. To follow up outpatient. Strict return precautions given.

## 2024-11-25 NOTE — ED PROVIDER NOTE - PATIENT PORTAL LINK FT
You can access the FollowMyHealth Patient Portal offered by [facility] by registering at the following website: [email or web address]. By joining [employer] [facility]’s FollowMyHealth portal, you will also be able to view your health information using other applications (apps) compatible with our system.

## 2024-11-25 NOTE — ED ADULT TRIAGE NOTE - WEIGHT IN KG
Social Work - Dementia Care Management:    Phoned caregiver, son Behzad, returning voice mail from last week. I advised him my email to a second address had bounced back; queried whether his girlfriend Mitzy had sent the materials I had emailed to her address, but he did not know. Collected a third email address to try. Son stated he is out with pt right now, at [facility], reminiscing. Advised son I would email materials again, asked him to confirm receipt and contact me as needed.    After sending email, it bounced back. Forwarded message on to girlfriend and asked for confirmation of receipt. Will sent resources/information via [employer].      52.6

## 2024-11-25 NOTE — ED ADULT NURSE NOTE - DRUG PRE-SCREENING (DAST -1)
Problem: Anemia (Adult)  Goal: Identify Related Risk Factors and Signs and Symptoms  Related risk factors and signs and symptoms are identified upon initiation of Human Response Clinical Practice Guideline (CPG)   Outcome: Ongoing (interventions implemented as appropriate)  Here for blood transfusion and platelet transfusion .       Statement Selected

## 2025-08-10 ENCOUNTER — NON-APPOINTMENT (OUTPATIENT)
Age: 25
End: 2025-08-10